# Patient Record
Sex: FEMALE | Race: WHITE
[De-identification: names, ages, dates, MRNs, and addresses within clinical notes are randomized per-mention and may not be internally consistent; named-entity substitution may affect disease eponyms.]

---

## 2019-04-18 ENCOUNTER — HOSPITAL ENCOUNTER (INPATIENT)
Dept: HOSPITAL 74 - JER | Age: 49
LOS: 1 days | Discharge: HOME | DRG: 139 | End: 2019-04-19
Attending: NURSE PRACTITIONER | Admitting: INTERNAL MEDICINE
Payer: MEDICARE

## 2019-04-18 VITALS — BODY MASS INDEX: 21.8 KG/M2

## 2019-04-18 DIAGNOSIS — R91.8: ICD-10-CM

## 2019-04-18 DIAGNOSIS — Z85.850: ICD-10-CM

## 2019-04-18 DIAGNOSIS — C78.00: ICD-10-CM

## 2019-04-18 DIAGNOSIS — J90: ICD-10-CM

## 2019-04-18 DIAGNOSIS — Z84.1: ICD-10-CM

## 2019-04-18 DIAGNOSIS — F41.9: ICD-10-CM

## 2019-04-18 DIAGNOSIS — J18.9: Primary | ICD-10-CM

## 2019-04-18 DIAGNOSIS — E89.0: ICD-10-CM

## 2019-04-18 DIAGNOSIS — J98.11: ICD-10-CM

## 2019-04-18 LAB
ALBUMIN SERPL-MCNC: 3.7 G/DL (ref 3.4–5)
ALP SERPL-CCNC: 62 U/L (ref 45–117)
ALT SERPL-CCNC: 90 U/L (ref 13–61)
ANION GAP SERPL CALC-SCNC: 6 MMOL/L (ref 8–16)
AST SERPL-CCNC: 42 U/L (ref 15–37)
BASOPHILS # BLD: 0.5 % (ref 0–2)
BILIRUB SERPL-MCNC: 0.4 MG/DL (ref 0.2–1)
BUN SERPL-MCNC: 12 MG/DL (ref 7–18)
CALCIUM SERPL-MCNC: 8.6 MG/DL (ref 8.5–10.1)
CHLORIDE SERPL-SCNC: 103 MMOL/L (ref 98–107)
CO2 SERPL-SCNC: 28 MMOL/L (ref 21–32)
CREAT SERPL-MCNC: 0.5 MG/DL (ref 0.55–1.3)
DEPRECATED RDW RBC AUTO: 13.2 % (ref 11.6–15.6)
EOSINOPHIL # BLD: 12.7 % (ref 0–4.5)
GLUCOSE SERPL-MCNC: 87 MG/DL (ref 74–106)
HCT VFR BLD CALC: 41.7 % (ref 32.4–45.2)
HGB BLD-MCNC: 14 GM/DL (ref 10.7–15.3)
LYMPHOCYTES # BLD: 17.7 % (ref 8–40)
MCH RBC QN AUTO: 29 PG (ref 25.7–33.7)
MCHC RBC AUTO-ENTMCNC: 33.5 G/DL (ref 32–36)
MCV RBC: 86.5 FL (ref 80–96)
MONOCYTES # BLD AUTO: 6.7 % (ref 3.8–10.2)
NEUTROPHILS # BLD: 62.4 % (ref 42.8–82.8)
PLATELET # BLD AUTO: 358 K/MM3 (ref 134–434)
PMV BLD: 6.9 FL (ref 7.5–11.1)
POTASSIUM SERPLBLD-SCNC: 4 MMOL/L (ref 3.5–5.1)
PROT SERPL-MCNC: 8 G/DL (ref 6.4–8.2)
RBC # BLD AUTO: 4.82 M/MM3 (ref 3.6–5.2)
SODIUM SERPL-SCNC: 136 MMOL/L (ref 136–145)
WBC # BLD AUTO: 7.8 K/MM3 (ref 4–10)

## 2019-04-18 NOTE — PDOC
History of Present Illness





- General


Chief Complaint: Shortness of Breath


Stated Complaint: DIFFICULTY BREATHING


Time Seen by Provider: 04/18/19 21:58


History Source: Patient


Exam Limitations: No Limitations





- History of Present Illness


Initial Comments: 





04/18/19 22:43





HISTORY OF PRESENT ILLNESS: 49-year-old woman with past medical history of 

hypothyroidism who presents emergency department for evaluation of dry cough 

for 3 weeks now with associated chest pain. Patient reports her chest pain is a 

"squeezing tightness" which she rates 9/10. She notes the pain worsens with 

deep inspiration and coughing. Patient reports her cough was initially 

productive with green sputum but has since dried up over the 3 weeks. She 

denies any fevers, chills, shortness of breath, abdominal pain. Patient does 

experience occasional posttussive vomiting.





No recent travel or sick contacts. 


PAST MEDICAL HISTORY: see HPI


SURGICAL HISTORY: Denies


ALLERGIES: No known drug allergies





REVIEW OF SYSTEMS


General/Constitutional: Denies fever or chills. Denies weakness, weight change.


HEENT: Denies change in vision. Denies ear pain or discharge. Denies sore 

throat.


Cardiovascular: see HPI


Respiratory: see HPI


Gastrointestinal: see HPI


Genitourinary: Denies dysuria, frequency, or change in urination.


Musculoskeletal: Denies joint or muscle swelling or pain. Denies neck or back 

pain.


Skin and breasts: Denies rash or easy bruising.


Neurologic: Denies headache, vertigo, loss of consciousness, or loss of 

sensation.


Psychiatric: Denies depression or anxiety.


Endocrine: Denies increased thirst. Denies abnormal weight change.


Hematologic/Lymphatic: Denies anemia, easy bleeding, or history of blood clots.


Allergic/Immunologic: Denies hives or skin allergy. Denies latex allergy.











PHYSICAL EXAM


General Appearance: Well-appearing, appropriately dressed.  No apparent distress

, no intoxication.


HEENT: EOMI, PERRLA, normal ENT inspection, normal voice, TMs normal, pharynx 

normal.  No conjunctival pallor.  No photophobia, scleral icterus.


Neck: Supple.  Trachea midline. No tenderness, rigidity, carotid bruit, stridor

, lymphadenopathy, or thyromegaly. 


Respiratory/Chest: Lungs CTAB.  No shortness of breath, chest tenderness, 

respiratory distress, accessory muscle use. No crackles, rales, rhonchi, stridor

, wheezing, dullness


Cardiovascular: RRR. S1, S2.  No JVD, murmur, bradycardia, tachycardia.








Past History





- Past Medical History


Allergies/Adverse Reactions: 


 Allergies











Allergy/AdvReac Type Severity Reaction Status Date / Time


 


azithromycin [From Zithromax] Allergy   Verified 04/18/19 20:40











Home Medications: 


Ambulatory Orders





Amoxicillin/Potassium Clav [Augmentin 875-125 Tablet] 1 each PO BID #10 tablet 

04/19/19 








Cancer: Yes (THYROID)


COPD: No





- Suicide/Smoking/Psychosocial Hx


Smoking History: Never smoked





*Physical Exam





- Vital Signs


 Last Vital Signs











Temp Pulse Resp BP Pulse Ox


 


 97.7 F   85   20   116/43 L  98 


 


 04/18/19 20:39  04/18/19 20:39  04/18/19 20:39  04/18/19 20:39  04/18/19 20:39














ED Treatment Course





- LABORATORY


CBC & Chemistry Diagram: 


 04/18/19 22:21





 04/18/19 22:21





- RADIOLOGY


Radiology Studies Ordered: 














 Category Date Time Status


 


 CHEST PA & LAT [RAD] Stat Radiology  04/18/19 22:00 Ordered














Medical Decision Making





- Medical Decision Making





04/18/19 22:45


A/P:


49-year-old woman with upper respiratory symptoms for 3 weeks





Differential diagnosis includes but is not limited to ACS, upper respiratory 

infection, pneumonia, GERD, costochondritis





Labs including cardiac profile, urine, chest x-ray


Reassess-likely discharge


04/19/19 00:52





Chest x-rays read by me: Left-sided pleural effusion present.





Results of the x-ray were explained to the patient patient states she does not 

have hypothyroidism but had thyroid cancer status post thyroidectomy.





Chest CT with IV contrast to rule out metastatic disease


04/19/19 01:47


CT of the chest is read by imaging on call: Probable metastatic neoplasm.


Multiple bilateral lung masses as well as a suspected right-sided lymphangitic 

spread of neoplasm.


Mediastinal and left hilar adenopathy with primary possible left infrahilar 

mass.


Moderate left pleural effusion with associated compressive atelectasis and/or 

pneumonia.





Patient states she is aware of CAT scan findings and has an appointment at 

[facility] with Dr. García next week. As patient has a 

postobstructive pneumonia, I will admit for IV antibiotics.





Blood cultures


Levaquin 750 mg IV 1


04/19/19 02:11


Case discussed with nurse practitioner Adarsh of the hospitalist service who 

accepts patient for inpatient admission to Dr. Cox.





*DC/Admit/Observation/Transfer


Diagnosis at time of Disposition: 


 Lung cancer





CAP (community acquired pneumonia)


Qualifiers:


 Laterality: left Lung location: unspecified part of lung Qualified Code(s): 

J18.9 - Pneumonia, unspecified organism








- Discharge Dispostion


Disposition: HOME


Condition at time of disposition: Guarded


Decision to Admit order: Yes





- Referrals





- Patient Instructions





- Post Discharge Activity

## 2019-04-19 VITALS — HEART RATE: 72 BPM | TEMPERATURE: 98.9 F | DIASTOLIC BLOOD PRESSURE: 59 MMHG | SYSTOLIC BLOOD PRESSURE: 107 MMHG

## 2019-04-19 RX ADMIN — ALBUTEROL SULFATE PRN AMP: 2.5 SOLUTION RESPIRATORY (INHALATION) at 08:07

## 2019-04-19 RX ADMIN — ALBUTEROL SULFATE PRN AMP: 2.5 SOLUTION RESPIRATORY (INHALATION) at 14:08

## 2019-04-19 RX ADMIN — ALBUTEROL SULFATE PRN AMP: 2.5 SOLUTION RESPIRATORY (INHALATION) at 03:16

## 2019-04-19 NOTE — CON.PULM
Consult


Consult Specialty:: PULMONARY


Referred by:: PMJOSE


Reason for Consultation:: SOB





- History of Present Illness


Chief Complaint: SOB WORSENING FOR THREE WEEKS


History of Present Illness: 





This is a 50 y/o woman with a PMHx of Thyroid Ca ,she does not know pathology,s/

p Thyroidectomy, ([facility] x2, 2017), Anxiety. Who presents to the ED with a 

productive cough yellow- green phlegm increased SOB x 2 weeks. Patient reports 

having pleuritic chest tightness worse with deep inspiration and cough. Patient 

reports having an appointment scheduled with her Oncologist next week. Patient 

denies fever, chills, palpitations, AP, V/D, constipation, dysuria








- History Source


History Provided By: Patient, Medical Record


Limitations to Obtaining History: No Limitations





- Past Medical History


Cardio/Vascular: No: AFIB


Pulmonary: No: Asthma, COPD


Gastrointestinal: No: Ascites


Hepatobiliary: No: Cirrhosis


Renal/: No: Renal Failure


...Pregnant: No


Heme/Onc: No: Anemia


Endocrine: Yes: Other (thyroid cancer)





- Past Surgical History


Additional Surgical History: thyroidectomy





- Alcohol/Substance Use


Hx Alcohol Use: No





- Smoking History


Smoking history: Never smoked


Have you smoked in the past 12 months: No





- Social History


Usual Living Arrangement: With Spouse


Place of Birth: Other


History of Recent Travel: No





Home Medications





- Allergies


Allergies/Adverse Reactions: 


 Allergies











Allergy/AdvReac Type Severity Reaction Status Date / Time


 


azithromycin [From Zithromax] Allergy   Verified 04/18/19 20:40














Family Disease History





- Family Disease History


Family History: Unremarkable





Review of Systems





- Review of Systems


Constitutional: denies: Fever


Eyes: denies: Blurred Vision


HENT: denies: Difficult Swallowing


Neck: denies: Decreased ROM


Cardiovascular: reports: Chest Pain


Respiratory: reports: Cough, Exercise Intolerance, SOB, SOB on Exertion.  denies

: Hemoptysis, Wheezing


Gastrointestinal: denies: Abdominal Pain


Genitourinary: denies: Burning





Physical Exam


Vital Sings: 


 Vital Signs











Temperature  98.9 F   04/19/19 14:49


 


Pulse Rate  72   04/19/19 14:49


 


Respiratory Rate  20   04/19/19 14:49


 


Blood Pressure  107/59 L  04/19/19 14:49


 


O2 Sat by Pulse Oximetry (%)  97   04/19/19 09:00











Constitutional: Yes: Calm


Eyes: Yes: EOM Intact


HENT: Yes: Normocephalic


Neck: Yes: Trachea Midline


Cardiovascular: Yes: Regular Rate and Rhythm


Respiratory: Yes: Diminished (on left base), Dullness


Gastrointestinal: Yes: Normal Bowel Sounds


Edema: No


Neurological: Yes: Alert


Labs: 


 CBC, BMP





 04/18/19 22:21 





 04/18/19 22:21 











Imaging





- Results


Chest X-ray: Report Reviewed, Image Reviewed


Cat Scan: Report Reviewed, Image Reviewed





Problem List





- Problems


(1) Primary cancer of thyroid with metastasis to other site


Code(s): C73 - MALIGNANT NEOPLASM OF THYROID GLAND   





(2) History of thyroid cancer


Code(s): Z85.850 - PERSONAL HISTORY OF MALIGNANT NEOPLASM OF THYROID   





(3) Pleural effusion


Code(s): J90 - PLEURAL EFFUSION, NOT ELSEWHERE CLASSIFIED   





Assessment/Plan





SPOKE WITH ONCO FROM [place]


PATIENT HAD PAPILLARY THYROID CANCER WITH LUNG NODULES NOTED 2018


SHE IS SCHEDULED FOR A PET SCAN WITH HIM ON MONDAY


HER DYSPNEA IS LIKELY DUE TO PLEURAL EFFUSION WHICH WAS NOT PRESENT LAST YEAR 

AND LIKELY RELATED TO METASTATIC DISEASE


SHE WANTS TO GO HOME AND FEELS STABLE FROM A PULMONARY STANDPOINT





NO OBJECTION TO DISCHARGE AND FOLLOW UP WITH ONCO ON MONDAY





TARA BUTCHER MD

## 2019-04-19 NOTE — PN
Physical Exam: 


SUBJECTIVE: 


Patient seen and examined at the bedside.  in no acute distress, feels well 

currently.  reports feeling short of breath with physical activity. 


Unable to lay flat, and experiences breathing discomfort with position changes.

  She reports that her symptoms began 3 weeks ago, and worsened one week ago 

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 

were in quarantine for flu and pneumonia.  





Patient tells me that she prefer to go home and follow up with her oncologist 

as she has an appointment on Monday at [place].  


Assured her that I will reach out to her oncologist and update him.





OBJECTIVE:


discussed with Dr. Huizar (ID), who will put  her on Zosyn antibiotics.


flu swab now


Left message for Dr. Brown, patient's oncologist ([phone]) on his voice mail 

.  awaiting call back


while patient is here, will have her see pulmonary for the large left pleural 

effusion for further recommendations.





spoke to Dr. Brown.  who agrees with our POC and asked for an IR intervention for 

possible thoracentesis. 





 Vital Signs











 Period  Temp  Pulse  Resp  BP Sys/Nava  Pulse Ox


 


 Last 24 Hr  97.7 F-98.7 F  69-85  16-24  /43-67  97-98








GENERAL: The patient is awake, alert, and fully oriented, in no acute distress. 


HEAD: Normal with no signs of trauma.


EYES: PERRL, extraocular movements intact, sclera anicteric, conjunctiva clear. 

No ptosis. 


ENT: Ears normal, nares patent, oropharynx clear without exudates, moist mucous 

membranes.


NECK: Trachea midline, full range of motion, supple. 


LUNGS: left lung with diminished breath sounds.  


HEART: Regular rate and rhythm 


ABDOMEN: Soft, nontender, nondistended, normoactive bowel sounds, no guarding  


EXTREMITIES: no edema. 


NEUROLOGICAL: Normal speech, gait steady


PSYCH: Normal mood, normal affect.


SKIN: Warm, dry, normal turgor, no rashes or lesions noted


 Laboratory Results - last 24 hr











  04/18/19 04/18/19 04/18/19





  22:21 22:21 22:21


 


WBC   7.8 


 


RBC   4.82 


 


Hgb   14.0 


 


Hct   41.7 


 


MCV   86.5 


 


MCH   29.0 


 


MCHC   33.5 


 


RDW   13.2 


 


Plt Count   358 


 


MPV   6.9 L 


 


Absolute Neuts (auto)   4.9 


 


Neutrophils %   62.4 


 


Lymphocytes %   17.7 


 


Monocytes %   6.7 


 


Eosinophils %   12.7 H 


 


Basophils %   0.5 


 


Nucleated RBC %   0 


 


Sodium    136


 


Potassium    4.0


 


Chloride    103


 


Carbon Dioxide    28


 


Anion Gap    6 L


 


BUN    12


 


Creatinine    0.5 L


 


Creat Clearance w eGFR    132.25


 


Random Glucose    87


 


Calcium    8.6


 


Total Bilirubin    0.4


 


AST    42 H


 


ALT    90 H


 


Alkaline Phosphatase    62


 


Creatine Kinase    76


 


Troponin I    < 0.02


 


Total Protein    8.0


 


Albumin    3.7


 


Urine HCG, Qual  Negative  








Active Medications











Generic Name Dose Route Start Last Admin





  Trade Name Freq  PRN Reason Stop Dose Admin


 


Albuterol Sulfate  1 amp  04/19/19 03:12  04/19/19 08:07





  Ventolin 0.083% Nebulizer Soln -  NEB   1 amp





  Q6H PRN   Administration





  SHORT OF BREATH/WHEEZING   





     





     





     


 


Heparin Sodium (Porcine)  5,000 unit  04/19/19 10:00  04/19/19 09:50





  Heparin -  SQ   5,000 unit





  BID HECTOR   Administration





     





     





     





     


 


Levofloxacin  750 mg in 150 mls @ 100 mls/hr  04/20/19 10:00  





  Levaquin 750 Mg Premixed Ivpb -  IVPB   





  DAILY HECTOR   





     





     





  Protocol   





     


 


Levothyroxine Sodium  100 mcg  04/19/19 07:00  04/19/19 06:20





  Synthroid -  PO   100 mcg





  DAILY@0700 HECTOR   Administration





     





     





     





     











ASSESSMENT/PLAN:





Patient is a 49 year old female with a past medical history of thyroid Ca s/p 

Thyroidectomy, (last radiation treatment 2017) and anxiety. She presents to the 

ED with a productive cough yellow phlegm increased SOB x 3 weeks with 

ambulation and worsened one week ago after she was exposed to sick contacts. 


Patient reports having pleuritic chest tightness worse with deep inspiration 

and cough. Patient denies fever, chills, palpitations. 





Pulm

## 2019-04-19 NOTE — HP
CHIEF COMPLAINT: Productive Cough





PCP: None


Oncologist: Dr. Brown ([phone]) [facility]





HISTORY OF PRESENT ILLNESS:


This is a 50 y/o woman with a PMHx of Thyroid Ca s/p Thyroidectomy, (RT x2, 2017

), Anxiety. Who presents to the ED with a productive cough yellow- green phlegm 

increased SOB x 2 weeks. Patient reports having pleuritic chest tightness worse 

with deep inspiration and cough. Patient reports having an appointment 

scheduled with her Oncologist next week. Patient denies fever, chills, 

palpitations, AP, V/D, constipation, dysuria





ER course was notable for:


(1) Chest Xray- Diffuse interstitial nodules, Left Pleural Effusion


(2) Chest CT- Probable metastatic neoplasm. Moderate Left Pleural Effusion


(3)





Recent Travel: None





PAST MEDICAL HISTORY:


Thyroid Ca





PAST SURGICAL HISTORY:


Thyroidectomy





Social History:


Smoking: Denies


Alcohol:  Denies


Drugs:    Denies


Lives alone, not working





Family History:


Mother: Renal Ca





Allergies





azithromycin [From Zithromax] Allergy (Verified 04/18/19 20:40)


 








HOME MEDICATIONS:


REVIEW OF SYSTEMS


CONSTITUTIONAL: 


Absent:  fever, chills, diaphoresis, generalized weakness, malaise, loss of 

appetite, weight change


HEENT: 


Absent:  rhinorrhea, nasal congestion, throat pain, throat swelling, difficulty 

swallowing, mouth swelling, ear pain, eye pain, visual changes


CARDIOVASCULAR: chest tightness


Absent: syncope, palpitations, irregular heart rate, lightheadedness, 

peripheral edema


RESPIRATORY: 


Absent: cough, shortness of breath, dyspnea with exertion, orthopnea, wheezing, 

stridor, hemoptysis


GASTROINTESTINAL:


Absent: abdominal pain, abdominal distension, nausea, vomiting, diarrhea, 

constipation, melena, hematochezia


GENITOURINARY: 


Absent: dysuria, frequency, urgency, hesitancy, hematuria, flank pain, genital 

pain


MUSCULOSKELETAL: 


Absent: myalgia, arthralgia, joint swelling, back pain, neck pain


SKIN: 


Absent: rash, itching, pallor


HEMATOLOGIC/IMMUNOLOGIC: 


Absent: easy bleeding, easy bruising, lymphadenopathy, frequent infections


ENDOCRINE:


Absent: unexplained weight gain, unexplained weight loss, heat intolerance, 

cold intolerance


NEUROLOGIC: 


Absent: headache, focal weakness or paresthesias, dizziness, unsteady gait, 

seizure, mental status changes, bladder or bowel incontinence


PSYCHIATRIC: 


Absent: anxiety, depression, suicidal or homicidal ideation, hallucinations.








PHYSICAL EXAMINATION


 Vital Signs - 24 hr











  04/18/19





  20:39


 


Temperature 97.7 F


 


Pulse Rate 85


 


Respiratory 20





Rate 


 


Blood Pressure 116/43 L


 


O2 Sat by Pulse 98





Oximetry (%) 











GENERAL: Anxious, tearful, awake, alert, and fully oriented, in no acute 

distress.


HEAD: Normal with no signs of trauma.


EYES: Pupils equal, round and reactive to light, extraocular movements intact, 

sclera anicteric, conjunctiva clear. No lid lag.


EARS, NOSE, THROAT: Dry mucous membranes Ears normal, nares patent, oropharynx 

clear without exudates. 


NECK: Normal range of motion, supple without lymphadenopathy, JVD, or masses.


LUNGS: Breath sounds diminished to bases. No wheezes, and no crackles. No 

accessory muscle use.


HEART: Regular rate and rhythm, normal S1 and S2 without murmur, rub or gallop.


ABDOMEN: Soft, nontender, not distended, normoactive bowel sounds, no guarding, 

no rebound, no masses.  No hepatomegaly or  splenomegaly. 


MUSCULOSKELETAL: Normal range of motion at all joints. No bony deformities or 

tenderness. No CVA tenderness.


UPPER EXTREMITIES: 2+ pulses, warm, well-perfused. No cyanosis. No clubbing. No 

peripheral edema.


LOWER EXTREMITIES: 2+ pulses, warm, well-perfused. No calf tenderness. No 

peripheral edema. 


NEUROLOGICAL:  Cranial nerves II-XII intact. Normal speech. Gait not observed.


PSYCHIATRIC: Cooperative. Good eye contact. Appropriate mood and affect.


SKIN: Warm, dry, normal turgor, no rashes or lesions noted, normal capillary 

refill. 





 Laboratory Results - last 24 hr











  04/18/19 04/18/19 04/18/19





  22:21 22:21 22:21


 


WBC   7.8 


 


RBC   4.82 


 


Hgb   14.0 


 


Hct   41.7 


 


MCV   86.5 


 


MCH   29.0 


 


MCHC   33.5 


 


RDW   13.2 


 


Plt Count   358 


 


MPV   6.9 L 


 


Absolute Neuts (auto)   4.9 


 


Neutrophils %   62.4 


 


Lymphocytes %   17.7 


 


Monocytes %   6.7 


 


Eosinophils %   12.7 H 


 


Basophils %   0.5 


 


Nucleated RBC %   0 


 


Sodium    136


 


Potassium    4.0


 


Chloride    103


 


Carbon Dioxide    28


 


Anion Gap    6 L


 


BUN    12


 


Creatinine    0.5 L


 


Creat Clearance w eGFR    132.25


 


Random Glucose    87


 


Calcium    8.6


 


Total Bilirubin    0.4


 


AST    42 H


 


ALT    90 H


 


Alkaline Phosphatase    62


 


Creatine Kinase    76


 


Troponin I    < 0.02


 


Total Protein    8.0


 


Albumin    3.7


 


Urine HCG, Qual  Negative  














Radiology Studies:





Chest CT





IMPRESSION: Probable metastatic neoplasm.


Multiple bilateral lung masses as well as suspected right-sided lymphangitic 

spread of neoplasm.


Mediastinal and left hilar adenopathy with primary possible left infrahilar 

mass.


Moderate left pleural effusion with associated compressive atelectasis and/or 

pneumonia.


One or more of the following dose reduction techniques were used: automated 

exposure control,


adjustment of the mA and/or kV according to patient size, use of iterative 

reconstructive technique.


THIS DOCUMENT HAS BEEN ELECTRONICALLY SIGNED


Orlando Ochoa MD


04/19/2019 01:23 EST


M.D. Please call Imaging On Call [phone] ([phone]) with questions.




















ASSESSMENT/PLAN:


This is a 50 y/o woman with a PMHx of Thyroid Ca s/p Thyroidectomy, Anxiety. 

Admitted for Pneumonia, Lung Ca for further evaluation of their emergent 

condition.





Plan:


See Problem List





FEN


PO fluids as tolerated


Replete lytes prn


Regular Diet





DVT ppx


OOB


SCDs


Heparin SQ





Dispo: Requires Inpatient Care








Problem List





- Problem


(1) CAP (community acquired pneumonia)


Assessment/Plan: 


Likely secondary to Lung Ca


Chest CT reviewed


Started on Levaquin in ED, will continue


Appreciate ID consult


No leukocytosis, no neutrophilia


Monitor CBC, BMP


Monitor vitals


O2





Code(s): J18.9 - PNEUMONIA, UNSPECIFIED ORGANISM   


Qualifiers: 


   Laterality: left   Lung location: unspecified part of lung   Qualified Code(s

): J18.9 - Pneumonia, unspecified organism   





(2) Lung cancer


Assessment/Plan: 


Chest CT report- Probable metastatic neoplasm.


Multiple bilateral lung masses as well as suspected right-sided lymphangitic 

spread of neoplasm.


Mediastinal and left hilar adenopathy with primary possible left infrahilar 

mass.


Moderate left pleural effusion with associated compressive atelectasis and/or 

pneumonia.


Patient reports having an appt with her Oncologist next week- Dr Vasu Brown ([facility]- 

[phone])


Consider Oncology consult if condition worsens


O2





Code(s): C34.90 - MALIGNANT NEOPLASM OF UNSP PART OF UNSP BRONCHUS OR LUNG   





(3) History of thyroid cancer


Assessment/Plan: 


s/p Thyroidectomy


Continue Levothyroxine





Code(s): Z85.850 - PERSONAL HISTORY OF MALIGNANT NEOPLASM OF THYROID   





Visit type





- Emergency Visit


Emergency Visit: Yes


ED Registration Date: 04/18/19


Care time: The patient presented to the Emergency Department on the above date 

and was hospitalized for further evaluation of their emergent condition.





- New Patient


This patient is new to me today: Yes


Date on this admission: 04/19/19





- Critical Care


Critical Care patient: No

## 2019-04-19 NOTE — DS
Physical Exam: 


SUBJECTIVE: Patient seen and examined








OBJECTIVE:





 Vital Signs











 Period  Temp  Pulse  Resp  BP Sys/Nava  Pulse Ox


 


 Last 24 Hr  97.7 F-98.9 F  69-85  16-24  /43-67  97-98








PHYSICAL EXAM





GENERAL: The patient is awake, alert, and fully oriented, in no acute distress.


HEAD: Normal with no signs of trauma.


EYES: PERRL, extraocular movements intact, sclera anicteric, conjunctiva clear. 


ENT: Ears normal, nares patent, oropharynx clear without exudates, moist mucous 

membranes.


NECK: Trachea midline, full range of motion, supple. 


LUNGS: Breath sounds equal, clear to auscultation bilaterally, no wheezes, no 

crackles, no accessory muscle use. 


HEART: Regular rate and rhythm, S1, S2 without murmur, rub or gallop.


ABDOMEN: Soft, nontender, nondistended, normoactive bowel sounds, no guarding, 

no rebound, no hepatosplenomegaly, no masses.


EXTREMITIES: 2+ pulses, warm, well-perfused, no edema. 


NEUROLOGICAL: Cranial nerves II through XII grossly intact. Normal speech, gait 

not observed.


PSYCH: Normal mood, normal affect.


SKIN: Warm, dry, normal turgor, no rashes or lesions noted.





LABS


 Laboratory Results - last 24 hr











  04/18/19 04/18/19 04/18/19





  22:21 22:21 22:21


 


WBC   7.8 


 


RBC   4.82 


 


Hgb   14.0 


 


Hct   41.7 


 


MCV   86.5 


 


MCH   29.0 


 


MCHC   33.5 


 


RDW   13.2 


 


Plt Count   358 


 


MPV   6.9 L 


 


Absolute Neuts (auto)   4.9 


 


Neutrophils %   62.4 


 


Lymphocytes %   17.7 


 


Monocytes %   6.7 


 


Eosinophils %   12.7 H 


 


Basophils %   0.5 


 


Nucleated RBC %   0 


 


Sodium    136


 


Potassium    4.0


 


Chloride    103


 


Carbon Dioxide    28


 


Anion Gap    6 L


 


BUN    12


 


Creatinine    0.5 L


 


Creat Clearance w eGFR    132.25


 


Random Glucose    87


 


Calcium    8.6


 


Total Bilirubin    0.4


 


AST    42 H


 


ALT    90 H


 


Alkaline Phosphatase    62


 


Creatine Kinase    76


 


Troponin I    < 0.02


 


Total Protein    8.0


 


Albumin    3.7


 


Urine HCG, Qual  Negative  


 


Influenza A (Rapid)   


 


Influenza B (Rapid)   














  04/19/19





  12:15


 


WBC 


 


RBC 


 


Hgb 


 


Hct 


 


MCV 


 


MCH 


 


MCHC 


 


RDW 


 


Plt Count 


 


MPV 


 


Absolute Neuts (auto) 


 


Neutrophils % 


 


Lymphocytes % 


 


Monocytes % 


 


Eosinophils % 


 


Basophils % 


 


Nucleated RBC % 


 


Sodium 


 


Potassium 


 


Chloride 


 


Carbon Dioxide 


 


Anion Gap 


 


BUN 


 


Creatinine 


 


Creat Clearance w eGFR 


 


Random Glucose 


 


Calcium 


 


Total Bilirubin 


 


AST 


 


ALT 


 


Alkaline Phosphatase 


 


Creatine Kinase 


 


Troponin I 


 


Total Protein 


 


Albumin 


 


Urine HCG, Qual 


 


Influenza A (Rapid)  Negative


 


Influenza B (Rapid)  Negative











HOSPITAL COURSE:





Date of Admission:04/19/19





Date of Discharge: 04/19/19














Discharge Summary


Reason For Visit: COMMUNITY ACQUIRED PNEUMONIA


Current Active Problems





CAP (community acquired pneumonia) (Acute)


History of thyroid cancer (Acute)


Lung cancer (Acute)


Pleural effusion (Acute)


Primary cancer of thyroid with metastasis to other site (Acute)








Condition: Guarded





- Instructions


Diet, Activity, Other Instructions: 


Mrs Isidro:





Please follow up with [facility] oncologist on Monday as scheduled. 





We have prescribed Augmentin 875mg twice daily for 5 days.  Continue this 

medication until completed.





Please return to the ED if you develop shortness of breath.





Thank you for allowing us to care for you.





[facility] @ [facility]


[phone]





Disposition: HOME





- Home Medications


Comprehensive Discharge Medication List: 


Ambulatory Orders





Amoxicillin/Potassium Clav [Augmentin 875-125 Tablet] 1 each PO BID #10 tablet 

04/19/19

## 2019-04-19 NOTE — PDOC
*Physical Exam





- Vital Signs


 Last Vital Signs











Temp Pulse Resp BP Pulse Ox


 


 97.7 F   85   20   116/43 L  98 


 


 04/18/19 20:39  04/18/19 20:39  04/18/19 20:39  04/18/19 20:39  04/18/19 20:39














ED Treatment Course





- LABORATORY


CBC & Chemistry Diagram: 


 04/18/19 22:21





 04/18/19 22:21





- ADDITIONAL ORDERS


Additional order review: 


 Laboratory  Results











  04/18/19 04/18/19





  22:21 22:21


 


Sodium  136 


 


Potassium  4.0 


 


Chloride  103 


 


Carbon Dioxide  28 


 


Anion Gap  6 L 


 


BUN  12 


 


Creatinine  0.5 L 


 


Creat Clearance w eGFR  132.25 


 


Random Glucose  87 


 


Calcium  8.6 


 


Total Bilirubin  0.4 


 


AST  42 H 


 


ALT  90 H 


 


Alkaline Phosphatase  62 


 


Creatine Kinase  76 


 


Troponin I  < 0.02 


 


Total Protein  8.0 


 


Albumin  3.7 


 


Urine HCG, Qual   Negative








 











  04/18/19





  22:21


 


RBC  4.82


 


MCV  86.5


 


MCHC  33.5


 


RDW  13.2


 


MPV  6.9 L


 


Neutrophils %  62.4


 


Lymphocytes %  17.7


 


Monocytes %  6.7


 


Eosinophils %  12.7 H


 


Basophils %  0.5














Medical Decision Making





- Medical Decision Making





04/19/19 01:42


Case discussed with NP Shahram


Agree with assessment and plan





*DC/Admit/Observation/Transfer


Diagnosis at time of Disposition: 


CAP (community acquired pneumonia)


Qualifiers:


 Laterality: left Lung location: unspecified part of lung Qualified Code(s): 

J18.9 - Pneumonia, unspecified organism





Lung cancer


Qualifiers:


 Laterality: unspecified laterality Lung location: unspecified part of lung 

Qualified Code(s): C34.90 - Malignant neoplasm of unspecified part of 

unspecified bronchus or lung








- Discharge Dispostion


Condition at time of disposition: Fair





- Referrals





- Patient Instructions





- Post Discharge Activity

## 2019-04-19 NOTE — CON.ID
Consult


Consult Specialty:: infectious diseases


Referred by:: valdemar


Reason for Consultation:: pneumonia





- History of Present Illness


Chief Complaint: sob





- Past Medical History


...Pregnant: No





- Alcohol/Substance Use


Hx Alcohol Use: No





- Smoking History


Smoking history: Never smoked





Home Medications





- Allergies


Allergies/Adverse Reactions: 


 Allergies











Allergy/AdvReac Type Severity Reaction Status Date / Time


 


azithromycin [From Zithromax] Allergy   Verified 04/18/19 20:40














Physical Exam


Vital Signs: 


 Vital Signs











Temperature  97.7 F   04/19/19 09:41


 


Pulse Rate  69   04/19/19 09:41


 


Respiratory Rate  20   04/19/19 09:41


 


Blood Pressure  105/57 L  04/19/19 09:41


 


O2 Sat by Pulse Oximetry (%)  97   04/19/19 02:35











Labs: 


 CBC, BMP





 04/18/19 22:21 





 04/18/19 22:21

## 2019-04-20 NOTE — EKG
Test Reason : 

Blood Pressure : ***/*** mmHG

Vent. Rate : 068 BPM     Atrial Rate : 068 BPM

   P-R Int : 132 ms          QRS Dur : 086 ms

    QT Int : 386 ms       P-R-T Axes : 054 058 037 degrees

   QTc Int : 410 ms

 

NORMAL SINUS RHYTHM

POSSIBLE LEFT ATRIAL ENLARGEMENT

BORDERLINE ECG

NO PREVIOUS ECGS AVAILABLE

Confirmed by MD CRISTA, WYATT ([id number]) on 4/20/2019 2:24:13 PM

 

Referred By:             Confirmed By:WYATT TOBIN MD